# Patient Record
Sex: FEMALE | Race: BLACK OR AFRICAN AMERICAN
[De-identification: names, ages, dates, MRNs, and addresses within clinical notes are randomized per-mention and may not be internally consistent; named-entity substitution may affect disease eponyms.]

---

## 2017-05-24 ENCOUNTER — HOSPITAL ENCOUNTER (EMERGENCY)
Dept: HOSPITAL 23 - CED | Age: 32
Discharge: HOME | End: 2017-05-24
Payer: COMMERCIAL

## 2017-05-24 DIAGNOSIS — H66.92: Primary | ICD-10-CM

## 2017-05-24 DIAGNOSIS — Z98.51: ICD-10-CM

## 2017-05-24 LAB
INFLUENZA A: (no result)
INFLUENZA B: (no result)

## 2021-11-09 ENCOUNTER — TELEPHONE (OUTPATIENT)
Dept: OBSTETRICS AND GYNECOLOGY | Facility: CLINIC | Age: 36
End: 2021-11-09

## 2021-11-09 NOTE — TELEPHONE ENCOUNTER
Tabatha    We cannot call anything in because she has not been seen by me in 5 years.    She can come to office now for a swab.  If not, you can check to see if Jannie has openings this week.    Thanks    Swathi

## 2021-11-09 NOTE — TELEPHONE ENCOUNTER
Good afternoon,  Patient is calling to see if provider can send in Rx for bacterial infection.  Patient stated she is having some discharge that is heavier than normal.  Patient stated the discharge is thick and white.  Patient stated it has a mild smell, but not a strong smell.  Patient stated it doesn't itch as much.      Please advise,   Thank you.      Pharmacy confirmed- [id number] [place], [place] - [address] AT [facility] [address] [facility] [phone] [facility] [phone] FX

## 2021-11-09 NOTE — TELEPHONE ENCOUNTER
Patient is aware and has no further questions.   Patient is scheduled to see Janine Cagle Thursday 11/11/21.

## 2021-12-01 ENCOUNTER — OFFICE VISIT (OUTPATIENT)
Dept: OBSTETRICS AND GYNECOLOGY | Facility: CLINIC | Age: 36
End: 2021-12-01

## 2021-12-01 VITALS
SYSTOLIC BLOOD PRESSURE: 120 MMHG | DIASTOLIC BLOOD PRESSURE: 85 MMHG | HEIGHT: 66 IN | BODY MASS INDEX: 29.25 KG/M2 | WEIGHT: 182 LBS

## 2021-12-01 DIAGNOSIS — Z01.419 VISIT FOR GYNECOLOGIC EXAMINATION: Primary | ICD-10-CM

## 2021-12-01 DIAGNOSIS — N89.8 VAGINAL DISCHARGE: ICD-10-CM

## 2021-12-01 DIAGNOSIS — N93.8 DYSFUNCTIONAL UTERINE BLEEDING: ICD-10-CM

## 2021-12-01 PROCEDURE — 99385 PREV VISIT NEW AGE 18-39: CPT | Performed by: OBSTETRICS & GYNECOLOGY

## 2021-12-01 PROCEDURE — 99213 OFFICE O/P EST LOW 20 MIN: CPT | Performed by: OBSTETRICS & GYNECOLOGY

## 2021-12-01 NOTE — PROGRESS NOTES
"[facility]  [address], [address]  [place], [place] [address]  Phone: [phone] / Fax:  [phone]      2021    [address] [place] [id number]    Provider, No Known    Chief Complaint   Patient presents with   • Gynecologic Exam     NP Annual Exam, last pap 12-10-15 NL..       Eneida Banerjee is here for annual gynecologic exam.  HPI - Patient with last pap 6 years ago and reported as normal.  She has been sterilized.  Patient reports \"change\" in menstrual cycles.  Her cycles vary from month to month and she reports 7 days of bleeding.  She uses 6+ pads on her heaviest day.  In addition, she reports vaginal discharge before her cycle.  This is associated with odor.    History reviewed. No pertinent past medical history.    Past Surgical History:   Procedure Laterality Date   • TUBAL ABDOMINAL LIGATION     • WISDOM TOOTH EXTRACTION         No Known Allergies    Social History     Socioeconomic History   • Marital status: Single   Tobacco Use   • Smoking status: Current Every Day Smoker   Substance and Sexual Activity   • Alcohol use: Yes   • Drug use: No   • Sexual activity: Yes     Partners: Male     Birth control/protection: Surgical       Family History   Problem Relation Age of Onset   • Stroke Father    • No Known Problems Mother    • No Known Problems Sister    • Breast cancer Neg Hx    • Colon cancer Neg Hx        Patient's last menstrual period was 2021 (exact date).    OB History        2    Para   2    Term   2            AB        Living   2       SAB        IAB        Ectopic        Molar        Multiple        Live Births                    Vitals:    21 1322   BP: 120/85   Weight: 82.6 kg (182 lb)   Height: 167.6 cm (66\")       Physical Exam  Constitutional:       Appearance: She is well-developed.   Genitourinary:      Right Labia: No rash or tenderness.     Left Labia: No tenderness or rash.     No vaginal discharge or tenderness.        " Right Adnexa: not tender and not full.     Left Adnexa: not tender and not full.     No cervical motion tenderness or lesion.      Uterus is not enlarged.   Breasts:      Right: No mass or nipple discharge.      Left: No mass or nipple discharge.       HENT:      Right Ear: External ear normal.      Left Ear: External ear normal.      Nose: Nose normal.   Eyes:      Conjunctiva/sclera: Conjunctivae normal.   Neck:      Thyroid: No thyromegaly.   Cardiovascular:      Rate and Rhythm: Normal rate and regular rhythm.      Heart sounds: Normal heart sounds.   Pulmonary:      Effort: Pulmonary effort is normal.      Breath sounds: No stridor. No wheezing.   Abdominal:      Palpations: Abdomen is soft. There is no mass.      Tenderness: There is no guarding or rebound.   Musculoskeletal:         General: Normal range of motion.      Cervical back: Normal range of motion and neck supple.   Neurological:      Mental Status: She is alert.      Coordination: Coordination normal.   Skin:     General: Skin is warm and dry.   Psychiatric:         Behavior: Behavior normal.         Thought Content: Thought content normal.         Judgment: Judgment normal.   Vitals reviewed.         Diagnoses and all orders for this visit:    1. Visit for gynecologic examination (Primary)  -     IgP, Aptima HPV  -     Discussed importance of regular screening and breast awareness.    2. Dysfunctional uterine bleeding  -     CBC (No Diff)  -     TSH  -     Follicle Stimulating Hormone  -     US Non-ob Transvaginal; Future  -     Patient to do work up for irregular and heavy cycles.  She should follow up when work up is completed.    3. Vaginal discharge  -     NuSwab VG+ - Swab, Vagina  -     Await cultures.        Filipe Echevarria MD

## 2021-12-02 LAB
ERYTHROCYTE [DISTWIDTH] IN BLOOD BY AUTOMATED COUNT: 13.2 % (ref 11.7–15.4)
FSH SERPL-ACNC: 14.6 MIU/ML
HCT VFR BLD AUTO: 37.7 % (ref 34–46.6)
HGB BLD-MCNC: 12.6 G/DL (ref 11.1–15.9)
MCH RBC QN AUTO: 29 PG (ref 26.6–33)
MCHC RBC AUTO-ENTMCNC: 33.4 G/DL (ref 31.5–35.7)
MCV RBC AUTO: 87 FL (ref 79–97)
PLATELET # BLD AUTO: 171 X10E3/UL (ref 150–450)
RBC # BLD AUTO: 4.34 X10E6/UL (ref 3.77–5.28)
TSH SERPL DL<=0.005 MIU/L-ACNC: 1.18 UIU/ML (ref 0.45–4.5)
WBC # BLD AUTO: 6.7 X10E3/UL (ref 3.4–10.8)

## 2021-12-03 ENCOUNTER — TELEPHONE (OUTPATIENT)
Dept: OBSTETRICS AND GYNECOLOGY | Facility: CLINIC | Age: 36
End: 2021-12-03

## 2021-12-03 LAB

## 2021-12-03 RX ORDER — METRONIDAZOLE 500 MG/1
500 TABLET ORAL 2 TIMES DAILY
Qty: 14 TABLET | Refills: 0 | Status: SHIPPED | OUTPATIENT
Start: 2021-12-03 | End: 2021-12-10

## 2021-12-03 NOTE — TELEPHONE ENCOUNTER
I spoke with patient and she is aware of Trichomonas.  She was treated previously in ED.  Her partner went to an [facility] and was negative.    I suggest re-treatment and abstinence from intercourse with QUIQUE in one month.    Flagyl was called to pharmacy.

## 2021-12-30 ENCOUNTER — TELEPHONE (OUTPATIENT)
Dept: OBSTETRICS AND GYNECOLOGY | Facility: CLINIC | Age: 36
End: 2021-12-30

## 2021-12-30 NOTE — TELEPHONE ENCOUNTER
Spoke with Eneida. Let her know that Dr Echevarria said that her work up for bleeding completed over the past 2 weeks was normal, including the ultrasound and blood work. She should be seen in 2 weeks for STD follow up. The first available appt was on 1/24/2022 at 2pm, 3 wks and she is scheduled.

## 2021-12-30 NOTE — TELEPHONE ENCOUNTER
Rand    Let her know that her work up for bleeding completed over the past 2 weeks was normal including ultrasound and blood work.    She should be seen in the next 2 weeks for STD follow up.    Please schedule.    Thanks    Swathi

## 2022-01-24 ENCOUNTER — OFFICE VISIT (OUTPATIENT)
Dept: OBSTETRICS AND GYNECOLOGY | Facility: CLINIC | Age: 37
End: 2022-01-24

## 2022-01-24 VITALS
DIASTOLIC BLOOD PRESSURE: 82 MMHG | WEIGHT: 182 LBS | HEIGHT: 66 IN | BODY MASS INDEX: 29.25 KG/M2 | SYSTOLIC BLOOD PRESSURE: 130 MMHG

## 2022-01-24 DIAGNOSIS — Z20.2 TRICHOMONAS CONTACT, TREATED: Primary | ICD-10-CM

## 2022-01-24 PROCEDURE — 99212 OFFICE O/P EST SF 10 MIN: CPT | Performed by: OBSTETRICS & GYNECOLOGY

## 2022-01-24 NOTE — PROGRESS NOTES
Subjective   Eneida Banerjee is a 36 y.o. female.     Cc:  Follow up to Trichomonas    History of Present Illness - Patient is a 36 year old female  with diagnosis of Trichomonas recently.  She completed course of Flagyl.  Partner was treated.  She denies discharge.  No spotting or abdominal pain.    The following portions of the patient's history were reviewed and updated as appropriate:   She  has a past medical history of Urogenital trichomoniasis.  She  reports that she has been smoking. She does not have any smokeless tobacco history on file. She reports current alcohol use. She reports that she does not use drugs.  No current outpatient medications on file.     No current facility-administered medications for this visit.     She has No Known Allergies..    Review of Systems   Constitutional: Negative for chills and fever.   Genitourinary: Negative for vaginal bleeding and vaginal discharge.       Objective   Physical Exam  Vitals reviewed. Exam conducted with a chaperone present.   Constitutional:       Appearance: Normal appearance.   Genitourinary:     General: Normal vulva.      Exam position: Lithotomy position.      Labia:         Right: No rash, tenderness or lesion.         Left: No rash, tenderness or lesion.       Vagina: Vaginal discharge present. No erythema.      Cervix: Normal.   Neurological:      Mental Status: She is alert.   Psychiatric:         Mood and Affect: Mood normal.         Behavior: Behavior normal.         Thought Content: Thought content normal.         Judgment: Judgment normal.         Assessment/Plan   Diagnoses and all orders for this visit:    1. Trichomonas contact, treated (Primary)  -     Chlamydia trachomatis, Neisseria gonorrhoeae, Trichomonas vaginalis, PCR - Swab, Vagina  -     Await cultures.     Filipe Echevarria MD

## 2022-01-26 ENCOUNTER — TELEPHONE (OUTPATIENT)
Dept: OBSTETRICS AND GYNECOLOGY | Facility: CLINIC | Age: 37
End: 2022-01-26

## 2022-01-26 LAB
C TRACH RRNA SPEC QL NAA+PROBE: NEGATIVE
N GONORRHOEA RRNA SPEC QL NAA+PROBE: NEGATIVE
T VAGINALIS DNA SPEC QL NAA+PROBE: NEGATIVE

## 2023-10-23 ENCOUNTER — OFFICE VISIT (OUTPATIENT)
Dept: OBSTETRICS AND GYNECOLOGY | Facility: CLINIC | Age: 38
End: 2023-10-23
Payer: COMMERCIAL

## 2023-10-23 VITALS
DIASTOLIC BLOOD PRESSURE: 78 MMHG | WEIGHT: 181 LBS | HEIGHT: 66 IN | SYSTOLIC BLOOD PRESSURE: 114 MMHG | BODY MASS INDEX: 29.09 KG/M2

## 2023-10-23 DIAGNOSIS — Z01.419 ENCOUNTER FOR GYNECOLOGICAL EXAMINATION: Primary | ICD-10-CM

## 2023-10-23 DIAGNOSIS — Z11.4 ENCOUNTER FOR SCREENING FOR HIV: ICD-10-CM

## 2023-10-23 DIAGNOSIS — Z11.3 SCREENING EXAMINATION FOR STD (SEXUALLY TRANSMITTED DISEASE): ICD-10-CM

## 2023-10-23 PROCEDURE — 1159F MED LIST DOCD IN RCRD: CPT | Performed by: OBSTETRICS & GYNECOLOGY

## 2023-10-23 PROCEDURE — 1160F RVW MEDS BY RX/DR IN RCRD: CPT | Performed by: OBSTETRICS & GYNECOLOGY

## 2023-10-23 PROCEDURE — 2014F MENTAL STATUS ASSESS: CPT | Performed by: OBSTETRICS & GYNECOLOGY

## 2023-10-23 PROCEDURE — 3008F BODY MASS INDEX DOCD: CPT | Performed by: OBSTETRICS & GYNECOLOGY

## 2023-10-23 PROCEDURE — 99395 PREV VISIT EST AGE 18-39: CPT | Performed by: OBSTETRICS & GYNECOLOGY

## 2023-10-23 RX ORDER — LEVONORGESTREL / ETHINYL ESTRADIOL AND ETHINYL ESTRADIOL 150-30(84)
1 KIT ORAL DAILY
Qty: 91 TABLET | Refills: 3 | Status: SHIPPED | OUTPATIENT
Start: 2023-10-23 | End: 2024-10-22

## 2023-10-23 NOTE — PROGRESS NOTES
"[facility]  [address], [address]  [place], [place] [address]  Phone: [phone] / Fax:  [phone]      10/23/2023    [address] [facility] [address]    Provider, No Known    Chief Complaint   Patient presents with    Gynecologic Exam     Annual Exam, last pap 12-1-21 NL HPV (-). Patient is requesting STD screening.       Eneida Banerjee is here for annual gynecologic exam.  HPI - Patient with last normal pap 2 years ago.  She has been sterilized and has regular but heavy periods.  She denies STD exposure but requests screening.    Past Medical History:   Diagnosis Date    Urogenital trichomoniasis        Past Surgical History:   Procedure Laterality Date    TUBAL ABDOMINAL LIGATION      WISDOM TOOTH EXTRACTION         No Known Allergies    Social History     Socioeconomic History    Marital status: Single   Tobacco Use    Smoking status: Former     Types: Cigarettes   Vaping Use    Vaping Use: Never used   Substance and Sexual Activity    Alcohol use: Yes    Drug use: No    Sexual activity: Yes     Partners: Male     Birth control/protection: Surgical       Family History   Problem Relation Age of Onset    Stroke Father     No Known Problems Mother     No Known Problems Sister     Breast cancer Neg Hx     Colon cancer Neg Hx        Patient's last menstrual period was 10/09/2023 (approximate).    OB History          2    Para   2    Term   2            AB        Living   2         SAB        IAB        Ectopic        Molar        Multiple        Live Births                    Vitals:    10/23/23 1603   BP: 114/78   Weight: 82.1 kg (181 lb)   Height: 167.6 cm (66\")       Physical Exam  Constitutional:       Appearance: Normal appearance. She is well-developed.   Genitourinary:      Bladder and urethral meatus normal.      Right Labia: No tenderness or lesions.     Left Labia: No tenderness or lesions.     No vaginal discharge or tenderness.        Right Adnexa: not tender " and not full.     Left Adnexa: not tender and not full.     No cervical motion tenderness or lesion.      Uterus is not enlarged or tender.      No urethral tenderness present.   Breasts:     Right: No mass or nipple discharge.      Left: No mass or nipple discharge.   HENT:      Right Ear: External ear normal.      Left Ear: External ear normal.      Nose: Nose normal.   Eyes:      Conjunctiva/sclera: Conjunctivae normal.   Neck:      Thyroid: No thyromegaly.   Cardiovascular:      Rate and Rhythm: Normal rate and regular rhythm.      Heart sounds: Normal heart sounds.   Pulmonary:      Effort: Pulmonary effort is normal.      Breath sounds: No stridor. No wheezing.   Abdominal:      Palpations: Abdomen is soft.      Tenderness: There is no abdominal tenderness. There is no guarding or rebound.   Musculoskeletal:         General: Normal range of motion.      Cervical back: Normal range of motion and neck supple.   Neurological:      Mental Status: She is alert.      Coordination: Coordination normal.   Skin:     General: Skin is warm and dry.   Psychiatric:         Mood and Affect: Mood normal.         Behavior: Behavior normal.         Thought Content: Thought content normal.         Judgment: Judgment normal.   Vitals reviewed. Exam conducted with a chaperone present.         Diagnoses and all orders for this visit:    1. Encounter for gynecological examination (Primary)  -     Levonorgest-Eth Estrad 91-Day (Seasonique) 0.15-0.03 &0.01 MG; Take 1 tablet by mouth Daily.  Dispense: 91 tablet; Refill: 3  -     Discussed use of OCP for cycle regulation.  -     Discussed regular screening.    2. Screening examination for STD (sexually transmitted disease)  -     RPR  -     Chlamydia trachomatis, Neisseria gonorrhoeae, Trichomonas vaginalis, PCR - Swab, Vagina  -     STD screening performed.    3. Encounter for screening for HIV  -     HIV-1 / O / 2 Ag / Antibody        Filipe Echevarria MD

## 2023-10-24 LAB
HIV 1+2 AB+HIV1 P24 AG SERPL QL IA: NON REACTIVE
RPR SER QL: NORMAL

## 2023-10-25 ENCOUNTER — TELEPHONE (OUTPATIENT)
Dept: OBSTETRICS AND GYNECOLOGY | Facility: CLINIC | Age: 38
End: 2023-10-25
Payer: COMMERCIAL

## 2023-10-25 LAB
C TRACH RRNA SPEC QL NAA+PROBE: NEGATIVE
N GONORRHOEA RRNA SPEC QL NAA+PROBE: NEGATIVE
T VAGINALIS RRNA SPEC QL NAA+PROBE: NEGATIVE

## 2025-08-14 ENCOUNTER — TELEPHONE (OUTPATIENT)
Dept: OBSTETRICS AND GYNECOLOGY | Facility: CLINIC | Age: 40
End: 2025-08-14
Payer: COMMERCIAL